# Patient Record
Sex: FEMALE | Race: WHITE | NOT HISPANIC OR LATINO | URBAN - METROPOLITAN AREA
[De-identification: names, ages, dates, MRNs, and addresses within clinical notes are randomized per-mention and may not be internally consistent; named-entity substitution may affect disease eponyms.]

---

## 2019-05-30 ENCOUNTER — NEW PATIENT (OUTPATIENT)
Dept: URBAN - METROPOLITAN AREA CLINIC 44 | Facility: CLINIC | Age: 69
End: 2019-05-30
Payer: MEDICARE

## 2019-05-30 PROCEDURE — 92004 COMPRE OPH EXAM NEW PT 1/>: CPT | Performed by: OPTOMETRIST

## 2019-05-30 ASSESSMENT — INTRAOCULAR PRESSURE
OS: 13
OD: 13

## 2019-05-30 ASSESSMENT — KERATOMETRY
OS: 41.25
OD: 41.50

## 2019-05-30 ASSESSMENT — VISUAL ACUITY
OS: 20/25
OD: 20/25

## 2019-12-17 ENCOUNTER — FOLLOW UP ESTABLISHED (OUTPATIENT)
Dept: URBAN - METROPOLITAN AREA CLINIC 44 | Facility: CLINIC | Age: 69
End: 2019-12-17
Payer: MEDICARE

## 2019-12-17 DIAGNOSIS — H11.31 CONJUNCTIVAL HEMORRHAGE, RIGHT EYE: ICD-10-CM

## 2019-12-17 PROCEDURE — 92014 COMPRE OPH EXAM EST PT 1/>: CPT | Performed by: OPTOMETRIST

## 2019-12-17 ASSESSMENT — INTRAOCULAR PRESSURE
OD: 14
OS: 14

## 2021-06-24 ENCOUNTER — OFFICE VISIT (OUTPATIENT)
Dept: URBAN - METROPOLITAN AREA CLINIC 44 | Facility: CLINIC | Age: 71
End: 2021-06-24
Payer: MEDICARE

## 2021-06-24 DIAGNOSIS — H43.811 VITREOUS DEGENERATION, RIGHT EYE: Primary | ICD-10-CM

## 2021-06-24 DIAGNOSIS — H25.813 COMBINED FORMS OF AGE-RELATED CATARACT, BILATERAL: ICD-10-CM

## 2021-06-24 DIAGNOSIS — H52.4 PRESBYOPIA: ICD-10-CM

## 2021-06-24 DIAGNOSIS — H57.13 OCULAR PAIN, BILATERAL: ICD-10-CM

## 2021-06-24 DIAGNOSIS — G43.B1 OPHTHALMOPLEGIC MIGRAINE, INTRACTABLE: ICD-10-CM

## 2021-06-24 PROCEDURE — 92014 COMPRE OPH EXAM EST PT 1/>: CPT | Performed by: OPTOMETRIST

## 2021-06-24 PROCEDURE — 92134 CPTRZ OPH DX IMG PST SGM RTA: CPT | Performed by: OPTOMETRIST

## 2021-06-24 ASSESSMENT — KERATOMETRY
OS: 41.63
OD: 41.50

## 2021-06-24 ASSESSMENT — INTRAOCULAR PRESSURE
OS: 14
OD: 15

## 2021-06-24 ASSESSMENT — VISUAL ACUITY
OD: 20/25
OS: 20/25

## 2021-06-24 NOTE — IMPRESSION/PLAN
Impression: Presbyopia: H52.4. Plan: Release new spec rx. Vision will not improve significantly with new glasses.

## 2021-06-24 NOTE — IMPRESSION/PLAN
Impression: Ophthalmoplegic migraine, intractable Plan: Symptoms most consistent with ophthalmic migraine, but cannot explain why eyes crossed or pins and needles sensation during episode Assured that ocular health wnl
RTC if symptoms recur Monitor

## 2021-06-24 NOTE — IMPRESSION/PLAN
Impression: Ocular pain, bilateral Plan: Dull achy pain OU x 1 year. Ocular health wnl (IOP wnl, no optic nerve edema/inflammation, no ischemia, no sign of orbital inflammation. ..etc) Symptoms do not appear to be eye or vision-related. Recommend see PCP for further evaluation.

## 2022-11-10 ENCOUNTER — OFFICE VISIT (OUTPATIENT)
Dept: URBAN - METROPOLITAN AREA CLINIC 44 | Facility: CLINIC | Age: 72
End: 2022-11-10
Payer: MEDICARE

## 2022-11-10 DIAGNOSIS — H52.4 PRESBYOPIA: ICD-10-CM

## 2022-11-10 DIAGNOSIS — H16.223 KERATOCONJUNCTIVITIS SICCA, BILATERAL: ICD-10-CM

## 2022-11-10 DIAGNOSIS — H25.813 COMBINED FORMS OF AGE-RELATED CATARACT, BILATERAL: Primary | ICD-10-CM

## 2022-11-10 DIAGNOSIS — H43.811 VITREOUS DEGENERATION, RIGHT EYE: ICD-10-CM

## 2022-11-10 PROCEDURE — 92134 CPTRZ OPH DX IMG PST SGM RTA: CPT

## 2022-11-10 PROCEDURE — 99214 OFFICE O/P EST MOD 30 MIN: CPT

## 2022-11-10 ASSESSMENT — KERATOMETRY
OS: 41.63
OD: 41.63

## 2022-11-10 ASSESSMENT — INTRAOCULAR PRESSURE
OS: 14
OD: 14

## 2022-11-10 ASSESSMENT — VISUAL ACUITY
OD: 20/25
OS: 20/20

## 2022-11-10 NOTE — IMPRESSION/PLAN
Impression: Keratoconjunctivitis sicca, bilateral: R43.650. Plan: Dry eyes account for the patient's complaints. There is no evidence of permanent changes to the cornea. Explained condition does not have a cure and will need artificial tears for maintenance. Patient instructed to use artificial tears 4-6x/daily. Explained it may take time for eyes to acclimate completely to OTC gtt regimen. Patient to call back if symptoms do not improve in 4-6weeks or symptoms worsen to discuss further intervention.

## 2022-11-10 NOTE — IMPRESSION/PLAN
Impression: Vitreous degeneration, right eye: H43.811. Plan: Ed pt on clinical findings. No retinal breaks/tears/detachments noted today. Advised pt to monitor for signs/symptoms of RD and to RTC stat if they develop. OCT MAC done today, no VMT.